# Patient Record
Sex: MALE | Race: WHITE | NOT HISPANIC OR LATINO
[De-identification: names, ages, dates, MRNs, and addresses within clinical notes are randomized per-mention and may not be internally consistent; named-entity substitution may affect disease eponyms.]

---

## 2020-09-02 ENCOUNTER — DASHBOARD ENCOUNTERS (OUTPATIENT)
Age: 40
End: 2020-09-02

## 2020-09-02 ENCOUNTER — OFFICE VISIT (OUTPATIENT)
Dept: URBAN - METROPOLITAN AREA CLINIC 78 | Facility: CLINIC | Age: 40
End: 2020-09-02
Payer: OTHER GOVERNMENT

## 2020-09-02 DIAGNOSIS — K74.3: ICD-10-CM

## 2020-09-02 PROBLEM — 162864005 BODY MASS INDEX 30+ - OBESITY: Status: ACTIVE | Noted: 2020-09-02

## 2020-09-02 PROCEDURE — 99213 OFFICE O/P EST LOW 20 MIN: CPT | Performed by: INTERNAL MEDICINE

## 2020-09-02 RX ORDER — PANTOPRAZOLE SODIUM 40 MG/1
TABLET, DELAYED RELEASE ORAL
Qty: 0 | Refills: 0 | Status: ON HOLD | COMMUNITY
Start: 1900-01-01

## 2020-09-02 NOTE — PHYSICAL EXAM HENT:
Head,  normocephalic,  atraumatic,  Face,  Face within normal limits,  Ears,  External ears within normal limits,  Nose/Nasopharynx,  External nose  normal appearance

## 2020-09-02 NOTE — HPI-TODAY'S VISIT:
Patient is seen in follow-up. He is doing well .He underwent total hip replacement ( left )  sec to wear and tear from playing soccer in NOvember  . 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 He recall having intermittent elevated LFT over the years  Labs with PCP 11/27/17    > 160  HDL 42 Dx labs in past : AMA 25.6 high Celiac panel Hepatitis panel neg/ TS 24 % / SERGEY neg /IG panel normal/ASMA neg